# Patient Record
Sex: FEMALE | Race: WHITE | Employment: OTHER | URBAN - METROPOLITAN AREA
[De-identification: names, ages, dates, MRNs, and addresses within clinical notes are randomized per-mention and may not be internally consistent; named-entity substitution may affect disease eponyms.]

---

## 2020-11-12 ENCOUNTER — HOSPITAL ENCOUNTER (OUTPATIENT)
Dept: MAMMOGRAPHY | Age: 80
Discharge: HOME | End: 2020-11-12
Payer: MEDICARE

## 2020-11-12 PROCEDURE — 77067 SCR MAMMO BI INCL CAD: CPT

## 2021-11-15 ENCOUNTER — HOSPITAL ENCOUNTER (OUTPATIENT)
Dept: MAMMOGRAPHY | Age: 81
Discharge: HOME | End: 2021-11-15
Payer: MEDICARE

## 2021-11-15 VITALS — BODY MASS INDEX: 20.55 KG/M2 | HEIGHT: 63 IN | WEIGHT: 116 LBS

## 2021-11-15 DIAGNOSIS — Z12.31 VISIT FOR SCREENING MAMMOGRAM: ICD-10-CM

## 2021-11-15 PROCEDURE — 77063 BREAST TOMOSYNTHESIS BI: CPT

## 2022-12-08 ENCOUNTER — HOSPITAL ENCOUNTER (OUTPATIENT)
Dept: MAMMOGRAPHY | Age: 82
Discharge: HOME | End: 2022-12-08
Payer: MEDICARE

## 2022-12-08 VITALS — HEIGHT: 62 IN | BODY MASS INDEX: 21.9 KG/M2 | WEIGHT: 119 LBS

## 2022-12-08 DIAGNOSIS — Z12.31 VISIT FOR SCREENING MAMMOGRAM: ICD-10-CM

## 2022-12-08 PROCEDURE — 77063 BREAST TOMOSYNTHESIS BI: CPT

## 2023-01-03 ENCOUNTER — TELEPHONE (OUTPATIENT)
Dept: SURGERY | Age: 83
End: 2023-01-03

## 2023-02-08 ENCOUNTER — OFFICE VISIT (OUTPATIENT)
Dept: SURGERY | Age: 83
End: 2023-02-08

## 2023-02-08 VITALS
TEMPERATURE: 98 F | HEIGHT: 62 IN | SYSTOLIC BLOOD PRESSURE: 129 MMHG | WEIGHT: 121.4 LBS | HEART RATE: 89 BPM | DIASTOLIC BLOOD PRESSURE: 69 MMHG | OXYGEN SATURATION: 98 % | BODY MASS INDEX: 22.34 KG/M2

## 2023-02-08 DIAGNOSIS — C50.912 MALIGNANT NEOPLASM OF LEFT FEMALE BREAST, UNSPECIFIED ESTROGEN RECEPTOR STATUS, UNSPECIFIED SITE OF BREAST (HCC): Primary | ICD-10-CM

## 2023-02-08 RX ORDER — ALBUTEROL SULFATE 90 UG/1
2 AEROSOL, METERED RESPIRATORY (INHALATION) EVERY 6 HOURS PRN
COMMUNITY
Start: 2021-12-20

## 2023-02-08 RX ORDER — B-COMPLEX WITH VITAMIN C
TABLET ORAL DAILY
COMMUNITY

## 2023-02-08 RX ORDER — MULTIVITAMIN
1 TABLET ORAL DAILY
COMMUNITY

## 2023-02-08 RX ORDER — UMECLIDINIUM 62.5 UG/1
AEROSOL, POWDER ORAL
COMMUNITY
Start: 2023-02-01

## 2023-02-08 NOTE — PROGRESS NOTES
[facility]    CC: Breast CA     Referring physician: Leland Morfin MD    HPI: This is a 78 y.o. female with a PMHx as delineated below who presents in consultation for breast reconstruction. She was found to have left breast cancer and underwent lumpectomy with aduvant radiation (2013). She notes significant asymmetry with multiple contour abnormalities. Plastic surgery was consulted for evaluation and treatment. The specifics of her breast cancer workup / treatment is as follows:     Diagnosis: DCIS  Mo/Yr Diagnosed: 2013  Breast Involved:Left  Surgery: Lumpectomy with sentinel lymph node biopsy  Date of Surgery: 2013  Stage: 0  Shahrzad status: Negative  ER: Positive [place]: Unknown HER2: Unknown  Radiation: COMPLETED RADIATION IN 2013    Chemo/Meds: None  Pregnancy/Miscarriages: 3 / 0    PMHx:   Past Medical History:   Diagnosis Date    Breast cancer ([facility] [address] 2013    History of therapeutic radiation    DVT (on Xarelto)    PSHx:   Past Surgical History:   Procedure Laterality Date    BREAST LUMPECTOMY Left 2013    malignant     Allergies: Not on File  FHx: Past history of breast CA: Yes (mother)    Meds: Xarelto    SocHx: Smoking: No    ETOH: occasional    Drug use: No    ROS   Constitutional: Negative for chills and fever. HENT: Negative for congestion, facial swelling, and voice change. Eyes: Negative for photophobia and visual disturbance. Respiratory: Negative for apnea, cough, chest tightness and shortness of breath. Cardiovascular: Negative for chest pain and palpitations. Gastrointestinal: Negative for dysphagia and early satiety. Genitourinary: Negative for difficulty urinating, dysuria, flank pain, frequency and hematuria. Musculoskeletal: Negative for new gait problem, joint swelling and myalgias. Skin: Negative for color change, pallor and rash. Endocrine: negative for tremors, temperature intolerance or polydipsia.   Allergic/Immunologic: Negative for new environmental or food allergies. Neurological: Negative for dizziness, seizures, speech difficulty, numbness. Hematological: Negative for adenopathy. Psychiatric/Behavioral: Negative for agitation and confusion. EXAM  /69   Pulse 89   Temp 98 °F (36.7 °C)   Ht 5' 2\" (1.575 m)   Wt 121 lb 6.4 oz (55.1 kg)   SpO2 98%   BMI 22.20 kg/m²     GEN: NAD, pleasant, appears stated age  CVS: RRR  PULM: No respiratory distress  HEENT: PERRLA/EOMI; hearing appears within normal limits  NECK: Supple with trachea in midline, no masses  EXT: No lymphedema noted  ABD: soft/NT/ND   NEURO: No focal deficits, no obvious CN deficits  BACK: Bilateral latiss muscle intact  BREAST:   Physical Exam    Bra size: 36B  Desired bra size: More symmetric  Ptosis grade:   R: 2   L: 1  The left breast size is smaller than the right breast.  There were no palpable masses with no palpable lymphadenopathy in the ipsilateral left axilla. Nipple retraction: No  Severe contracture on the medial aspect of the left breast with lumpectomy defect and radiation    Left breast sternal notch to nipple: 20 cm  Right breast sternal notch to nipple: 21 cm    Left breast nipple to inframammary fold: 6.2 cm  Right breast nipple to inframammary fold: 5.4 cm    Left breast width 15.8 cm  Right breast width 15 cm    IMAGING: Reviewed    IMP: 78 y.o. female with breast cancer who presents for discussion regarding reconstruction options  PLAN: Described options with Joseph Murphy including a latissimus flap for coverage of the medial aspect with removal of the scar tissue. She is going to determine if she desires to proceed and then contact the office.     Brianna Yip MD  [id number] [facility]  02/08/23

## 2024-02-09 ENCOUNTER — HOSPITAL ENCOUNTER (OUTPATIENT)
Dept: MAMMOGRAPHY | Age: 84
Discharge: HOME | End: 2024-02-09
Payer: MEDICARE

## 2024-02-09 VITALS — BODY MASS INDEX: 22.45 KG/M2 | WEIGHT: 122 LBS | HEIGHT: 62 IN

## 2024-02-09 DIAGNOSIS — Z12.31 VISIT FOR SCREENING MAMMOGRAM: ICD-10-CM

## 2024-02-09 PROCEDURE — 77067 SCR MAMMO BI INCL CAD: CPT

## 2025-02-28 ENCOUNTER — HOSPITAL ENCOUNTER (OUTPATIENT)
Dept: MAMMOGRAPHY | Age: 85
Discharge: HOME | End: 2025-02-28
Payer: MEDICARE

## 2025-02-28 ENCOUNTER — HOSPITAL ENCOUNTER (OUTPATIENT)
Dept: ULTRASOUND IMAGING | Age: 85
Discharge: HOME | End: 2025-02-28
Payer: MEDICARE

## 2025-02-28 VITALS — BODY MASS INDEX: 22.45 KG/M2 | WEIGHT: 122 LBS | HEIGHT: 62 IN

## 2025-02-28 DIAGNOSIS — N63.0 BREAST MASS IN FEMALE: ICD-10-CM

## 2025-02-28 DIAGNOSIS — Z12.31 VISIT FOR SCREENING MAMMOGRAM: ICD-10-CM

## 2025-02-28 DIAGNOSIS — N63.20 MASS OF LEFT BREAST, UNSPECIFIED QUADRANT: ICD-10-CM

## 2025-02-28 PROCEDURE — 76642 ULTRASOUND BREAST LIMITED: CPT

## 2025-02-28 PROCEDURE — A4648 IMPLANTABLE TISSUE MARKER: HCPCS

## 2025-02-28 PROCEDURE — 77063 BREAST TOMOSYNTHESIS BI: CPT

## 2025-02-28 PROCEDURE — 76942 ECHO GUIDE FOR BIOPSY: CPT

## 2025-03-11 ENCOUNTER — TELEPHONE (OUTPATIENT)
Dept: SURGERY | Age: 85
End: 2025-03-11

## 2025-03-11 NOTE — TELEPHONE ENCOUNTER
Lm with  patient was unable to take call at the time, patient will call back to schedule an apt. New breast cancer discuss oncoplastic closure but doesn't needs seen for 2-3 months due to chemo

## 2025-03-12 NOTE — TELEPHONE ENCOUNTER
Thanks.    The referral has been scanned into Epic under the media tab. Please schedule the patient when available once she calls the office.    I will close this phone note.

## 2025-03-12 NOTE — TELEPHONE ENCOUNTER
Patient states she is returning a missed call to the office. She has not met with her Oncologist just yet. Once she meets with her Oncologist and schedules Chemo she will call back to schedule her new patient visit for breast cancer to discuss oncoplastic closure.     Patient believes she will call some time next week and schedule in the month of May or June.     Patient can be reached at [phone].

## 2025-04-02 LAB
Lab: NORMAL
REPORT: NORMAL
THIS TEST SENT TO: NORMAL

## 2025-05-29 LAB
Lab: NORMAL
REPORT: NORMAL
THIS TEST SENT TO: NORMAL